# Patient Record
Sex: MALE | Race: WHITE
[De-identification: names, ages, dates, MRNs, and addresses within clinical notes are randomized per-mention and may not be internally consistent; named-entity substitution may affect disease eponyms.]

---

## 2021-05-28 ENCOUNTER — HOSPITAL ENCOUNTER (OUTPATIENT)
Dept: HOSPITAL 11 - JP.SDS | Age: 71
Discharge: HOME | End: 2021-05-28
Attending: SURGERY
Payer: MEDICARE

## 2021-05-28 DIAGNOSIS — I10: ICD-10-CM

## 2021-05-28 DIAGNOSIS — R11.2: ICD-10-CM

## 2021-05-28 DIAGNOSIS — K20.90: Primary | ICD-10-CM

## 2021-05-28 PROCEDURE — 43239 EGD BIOPSY SINGLE/MULTIPLE: CPT

## 2021-05-30 NOTE — OR
DATE OF PROCEDURE:  05/28/2021

 

SURGEON:  Fernando Moctezuma MD

 

PROCEDURE:  Esophagogastroduodenoscopy.

 

FINDINGS:

1. Polypoid-like lesion in the duodenal bulb, biopsied multiple times using cold biopsy

    forceps.

2. Inflammation at the GE junction with a salmon-like tongue protrusion approximately 1.5

    cm noted (biopsied using cold biopsy forceps in conjunction with all 4 quadrants).

 

COMPLICATIONS:  None.

 

ASSISTANT:  None.

 

ANESTHESIA:  MAC.

 

PREOPERATIVE DIAGNOSES:

1. Nausea and vomiting.

2. Epigastric pain.

 

POSTOPERATIVE DIAGNOSES:

1. Nausea and vomiting.

2. Epigastric pain.

 

RISKS:  Risks, benefits, alternatives, and limitations including but not limited to

infection, bleeding, and injury to abdominal structures were explained to the patient, and

they wished to proceed.

 

PROCEDURE IN DETAIL:  The patient was placed in left lateral decubitus position.  EGD scope

was introduced and advanced atraumatically to the second part of the duodenum.  Within the

duodenal bulb proper and the first part of the duodenum, the patient had a polypoid-type

lesion similar to small pea-like/grape-like structures.  This was biopsied multiple times

using cold biopsy forceps.

 

The scope was brought back into the stomach.  No evidence of gastritis or ulceration.

 

At the GE junction, there was mild inflammation consistent with reflux disease.  This was a

small tongue-like protrusion, was biopsied multiple times using cold biopsy forceps.  The

air was removed from the stomach.  The esophagus inspected without abnormality.  The patient

tolerated the procedure well.

 

 

 

 

Fernando Moctezuma MD

DD:  05/28/2021 11:17:54

DT:  05/28/2021 15:48:42

Job #:  [id number]

## 2021-06-01 ENCOUNTER — HOSPITAL ENCOUNTER (EMERGENCY)
Dept: HOSPITAL 11 - JP.ED | Age: 71
Discharge: HOME | End: 2021-06-01
Payer: MEDICARE

## 2021-06-01 DIAGNOSIS — R11.0: Primary | ICD-10-CM

## 2021-06-01 PROCEDURE — 99283 EMERGENCY DEPT VISIT LOW MDM: CPT

## 2021-06-01 PROCEDURE — 96372 THER/PROPH/DIAG INJ SC/IM: CPT

## 2021-06-01 NOTE — EDM.PDOC
ED HPI GENERAL MEDICAL PROBLEM





- General


Chief Complaint: Gastrointestinal Problem


Stated Complaint: DEHYDRATION/DR REFFERED


Time Seen by Provider: 06/01/21 11:30


Source of Information: Reports: Patient


History Limitations: Reports: No Limitations





- History of Present Illness


INITIAL COMMENTS - FREE TEXT/NARRATIVE: 





This is a 70-year-old male who presents with concerns of nausea.  He reports 

that his symptoms started approximately 1 month ago on a [place] when

he had "a burrito from [facility]".  He is in the midst of a work-up through his PCP, 

recently had EGD performed that was reportedly reassuring but pathology is 

pending.  He is also had a CT scan performed to the last few days that showed 

some gastritis as well as colitis.  Today he is in the ED because he feels like 

his nausea is getting to the point where he may become dehydrated.  He is hoping

that his biopsy results, which will not be available for a few days, will reveal

a cause for his symptoms.





He has been taking his Zofran only intermittently.  He reports he overall has 

given up on this medicine as he feels like it does not work.  He also has been 

trying his sucralfate.





He denies any abdominal pain.  No fevers.  No diarrhea.  Perhaps some 

intermittent flecks of blood in his vomit but no gross bleeding.  





- Related Data


                                    Allergies











Allergy/AdvReac Type Severity Reaction Status Date / Time


 


No Known Allergies Allergy   Verified 05/28/21 09:10











Home Meds: 


                                    Home Meds





Hydrochlorothiazide/Lisinopril [Lisinopril/HCTZ 20-12.5 MG] 12.5 - 20 mg PO 

DAILY 05/27/21 [History]


Omeprazole 40 mg PO DAILY 05/27/21 [History]


Ondansetron [Zofran ODT] 4 mg PO Q8HR PRN 05/27/21 [History]


Sucralfate 1 gm PO QID 06/01/21 [History]











Past Medical History


HEENT History: Reports: Impaired Vision


Cardiovascular History: Reports: Hypertension


Respiratory History: Reports: None


Gastrointestinal History: Reports: Other (See Below)


Other Gastrointestinal History: nausea/vomiting


Genitourinary History: Reports: None


Musculoskeletal History: Reports: None


Neurological History: Reports: None


Psychiatric History: Reports: None


Endocrine/Metabolic History: Reports: None


Hematologic History: Reports: None


Immunologic History: Reports: None


Oncologic (Cancer) History: Reports: None


Dermatologic History: Reports: None





- Infectious Disease History


Infectious Disease History: Reports: None





- Past Surgical History


Head Surgeries/Procedures: Reports: None


HEENT Surgical History: Reports: None


Cardiovascular Surgical History: Reports: None


Respiratory Surgical History: Reports: None


GI Surgical History: Reports: None


Male  Surgical History: Reports: None


Neurological Surgical History: Reports: None


Musculoskeletal Surgical History: Reports: None


Oncologic Surgical History: Reports: None


Dermatological Surgical History: Reports: None





Social & Family History





- Tobacco Use


Tobacco Use Status *Q: Never Tobacco User





- Caffeine Use


Caffeine Use: Reports: Coffee, Soda





- Recreational Drug Use


Recreational Drug Use: No





ED ROS GENERAL





- Review of Systems


Review Of Systems: See Below


Constitutional: Reports: No Symptoms


HEENT: Reports: No Symptoms


Respiratory: Reports: No Symptoms


Cardiovascular: Reports: No Symptoms


Endocrine: Reports: No Symptoms


GI/Abdominal: Reports: Nausea


: Reports: No Symptoms


Musculoskeletal: Reports: No Symptoms


Skin: Reports: No Symptoms


Neurological: Reports: No Symptoms


Psychiatric: Reports: No Symptoms


Hematologic/Lymphatic: Reports: No Symptoms


Immunologic: Reports: No Symptoms





ED EXAM, GI/ABD





- Physical Exam


Exam: See Below


Exam Limited By: No Limitations


General Appearance: Alert, No Apparent Distress


Ears: Normal External Exam


Nose: Normal Inspection


Throat/Mouth: Normal Inspection


Head: Atraumatic, Normocephalic


Neck: Normal Inspection


Respiratory/Chest: Lungs Clear


Cardiovascular: Regular Rate, Rhythm


GI/Abdominal Exam: Soft, Non-Tender, No Distention


Back Exam: Normal Inspection


Extremities: Normal Inspection


Neurological: Alert, Oriented


Psychiatric: Normal Affect, Normal Mood


Skin Exam: Warm, Dry





Course





- Vital Signs


Last Recorded V/S: 


                                Last Vital Signs











Temp  36.4 C   06/01/21 10:31


 


Pulse  57 L  06/01/21 10:31


 


Resp  16   06/01/21 10:31


 


BP  119/76   06/01/21 10:31


 


Pulse Ox  100   06/01/21 10:31














- Orders/Labs/Meds


Meds: 


Medications














Discontinued Medications














Generic Name Dose Route Start Last Admin





  Trade Name Freq  PRN Reason Stop Dose Admin


 


Droperidol  1.25 mg  06/01/21 11:50  06/01/21 11:57





  Droperidol 5 Mg/2 Ml Sdv  IM  06/01/21 12:19  1.25 mg





  ONETIME ONE   Administration


 


Ondansetron HCl  4 mg  06/01/21 11:51  06/01/21 11:57





  Ondansetron 4 Mg Tab.Dis  PO  06/01/21 11:52  4 mg





  ONETIME ONE   Administration














- Re-Assessments/Exams


Free Text/Narrative Re-Assessment/Exam: 








70-year-old presents with concerns of nausea.  This is been ongoing issue for 

him for the past month.  Recently had a CT scan as well as EEG performed as he 

is undergoing a diagnostic work-up through his primary doctor.  Today he is 

concerned that perhaps he may become dehydrated due to his ongoing symptoms.





His exam is reassuring.  He has normal vital signs.  My concern for new acute 

pathology that we need to investigate in the ED today is low.  We are going to 

treat him symptomatically, he is given a dose of IM droperidol as well as 

Zofran.  He is tolerating p.o.





He has not been taking his Zofran very regularly.  We discussed increasing the 

dose to 1-2 tabs every 4 hours as needed, encouraged him to give this medicine 

another try as it is frequently well-tolerated.  He is going to follow-up with 

his primary doctor, he was provided with return precautions.





06/01/21 18:49








Departure





- Departure


Time of Disposition: 14:00


Disposition: Home, Self-Care 01


Clinical Impression: 


 Nausea








- Discharge Information


*PRESCRIPTION DRUG MONITORING PROGRAM REVIEWED*: No


*COPY OF PRESCRIPTION DRUG MONITORING REPORT IN PATIENT SOREN: No


Instructions:  Nausea, Adult


Referrals: 


Juan Pablo Lawson MD [Primary Care Provider] - 


Forms:  ED Department Discharge


Additional Instructions: 


As discussed, please increase the frequency and dosage of your ondansetron.  You

can take 1-2 tabs of this medicine every 4 hours.  Continue on the sucralfate 

and the omeprazole as you are taking.





Please follow-up with your primary doctor regarding your nausea to coordinate 

the remainder of your work-up.





If you develop fevers, abdominal pain, or are truly unable to keep any fluids 

down please to return to the emergency room so we can reevaluate you.





Thank you for trusting us to care for you today.





Sepsis Event Note (ED)





- Evaluation


Sepsis Screening Result: No Definite Risk





- Focused Exam


Vital Signs: 


                                   Vital Signs











  Temp Pulse Resp BP Pulse Ox


 


 06/01/21 10:31  36.4 C  57 L  16  119/76  100


 


 06/01/21 10:30  36.4 C  57 L  16  119/76  100

## 2021-07-06 ENCOUNTER — HOSPITAL ENCOUNTER (OUTPATIENT)
Dept: HOSPITAL 11 - JP.SDS | Age: 71
Discharge: HOME | End: 2021-07-06
Attending: SURGERY
Payer: COMMERCIAL

## 2021-07-06 DIAGNOSIS — I10: ICD-10-CM

## 2021-07-06 DIAGNOSIS — Z12.11: Primary | ICD-10-CM

## 2021-07-06 DIAGNOSIS — D12.2: ICD-10-CM

## 2021-07-06 DIAGNOSIS — D12.3: ICD-10-CM

## 2021-07-06 PROCEDURE — 45385 COLONOSCOPY W/LESION REMOVAL: CPT

## 2021-07-06 PROCEDURE — 45380 COLONOSCOPY AND BIOPSY: CPT

## 2021-07-06 NOTE — OR
DATE OF PROCEDURE:  07/06/2021

 

SURGEON:  Fernando Moctezuma MD

 

PROCEDURE:  Colonoscopy.

 

FINDINGS:

1. Ascending colon polyp, approximately 5 mm, completely removed using hot snare wire

    device.

2. Descending colon polyp, approximately 3 mm, completely removed using cold biopsy

    forceps.

 

COMPLICATIONS:  None.

 

ASSISTANT:  None.

 

ANESTHESIA:  MAC.

 

PREOPERATIVE DIAGNOSIS:  Screening colonoscopy.

 

POSTOPERATIVE DIAGNOSIS:  Screening colonoscopy.

 

RISKS:  Risks, benefits, alternatives, and limitations including but not limited to

infection, bleeding, perforation, and false positives and false negatives were explained to

the patient who wished to proceed.

 

PROCEDURE IN DETAIL:  The patient was placed in left lateral decubitus position.  Digital

rectal exam was performed without abnormality.  Scope was introduced and advanced

atraumatically to the ileocecal valve.  A photo was taken of the appendiceal orifice.  The

scope was brought back to the ascending, transverse, and descending colon and retroflexed.

 

The aforementioned polyps were identified and completely removed as described above.

 

No other abnormalities.  No old or new blood.  No colitis.

 

The prep was marginal, approximately 85% of luminal surface could be seen with retained

solid and liquid stool.  Greater than 8 minutes was spent removing the scope.  No

abnormalities on retroflexion.  The patient tolerated the procedure well.

 

 

 

 

Fernando Moctezuma MD

DD:  07/06/2021 08:29:31

DT:  07/06/2021 11:54:06

Job #:  [id number]